# Patient Record
Sex: MALE | Race: WHITE | NOT HISPANIC OR LATINO | Employment: OTHER | URBAN - METROPOLITAN AREA
[De-identification: names, ages, dates, MRNs, and addresses within clinical notes are randomized per-mention and may not be internally consistent; named-entity substitution may affect disease eponyms.]

---

## 2022-04-06 ENCOUNTER — APPOINTMENT (OUTPATIENT)
Dept: GENERAL RADIOLOGY | Facility: HOSPITAL | Age: 47
End: 2022-04-06

## 2022-04-06 ENCOUNTER — HOSPITAL ENCOUNTER (EMERGENCY)
Facility: HOSPITAL | Age: 47
Discharge: HOME | End: 2022-04-06
Attending: EMERGENCY MEDICINE | Admitting: EMERGENCY MEDICINE

## 2022-04-06 VITALS
DIASTOLIC BLOOD PRESSURE: 83 MMHG | RESPIRATION RATE: 20 BRPM | SYSTOLIC BLOOD PRESSURE: 153 MMHG | WEIGHT: 315 LBS | HEART RATE: 100 BPM | HEIGHT: 73 IN | OXYGEN SATURATION: 94 % | BODY MASS INDEX: 41.75 KG/M2 | TEMPERATURE: 98.1 F

## 2022-04-06 DIAGNOSIS — S69.92XA INJURY OF LEFT WRIST, INITIAL ENCOUNTER: ICD-10-CM

## 2022-04-06 DIAGNOSIS — S39.012A STRAIN OF LUMBAR REGION, INITIAL ENCOUNTER: ICD-10-CM
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
## 2022-04-06 PROCEDURE — 73110 X-RAY EXAM OF WRIST: CPT

## 2022-04-06 PROCEDURE — 72100 X-RAY EXAM L-S SPINE 2/3 VWS: CPT

## 2022-04-06 PROCEDURE — 99283 EMERGENCY DEPT VISIT LOW MDM: CPT

## 2022-04-06 NOTE — ED PROVIDER NOTES
Subjective   Pt states he slipped on wet floor in [employer] today. Did not land on ground but caught himself on wall with left wrist and complains of back pain.       History provided by:  Patient  Fall  Mechanism of injury: fall    Injury location: left wrist and back.  Time since incident:  2 hours  Arrived directly from scene: yes    Associated symptoms: back pain        Review of Systems   Constitutional: Negative for appetite change, chills, fatigue and fever.   HENT: Negative.    Eyes: Negative.  Negative for photophobia.   Respiratory: Negative.    Gastrointestinal: Negative.    Endocrine: Negative.    Genitourinary: Negative.    Musculoskeletal: Positive for back pain.   Skin: Negative.    Allergic/Immunologic: Negative.  Negative for immunocompromised state.   Neurological: Negative.    Hematological: Negative.    Psychiatric/Behavioral: Negative.    All other systems reviewed and are negative.      History reviewed. No pertinent past medical history.    Allergies   Allergen Reactions   • Tramadol Anaphylaxis   • Penicillins Angioedema       History reviewed. No pertinent surgical history.    History reviewed. No pertinent family history.    Social History     Socioeconomic History   • Marital status:    Tobacco Use   • Smoking status: Current Every Day Smoker     Packs/day: 1.00     Years: 30.00     Pack years: 30.00     Types: Cigarettes   Substance and Sexual Activity   • Drug use: Defer   • Sexual activity: Defer           Objective   Physical Exam  Vitals and nursing note reviewed.   Constitutional:       General: He is not in acute distress.     Appearance: Normal appearance. He is not ill-appearing or toxic-appearing.   HENT:      Head: Normocephalic.   Cardiovascular:      Rate and Rhythm: Normal rate and regular rhythm.      Heart sounds: Normal heart sounds.   Pulmonary:      Effort: Pulmonary effort is normal.      Breath sounds: Normal breath sounds.   Musculoskeletal:        Arms:        Lumbar back: Tenderness present. Decreased range of motion.      Comments: Pain along that area  No saddle anesthesia    Neurological:      Mental Status: He is alert.           MDM  Number of Diagnoses or Management Options  Fall on same level from slipping, tripping or stumbling, initial encounter  Injury of left wrist, initial encounter  Strain of lumbar region, initial encounter  Diagnosis management comments: Pt is a 46 y.o. male that presents today with Patient presents with:  Fall       Work up today:  Lab Results (last 24 hours)     ** No results found for the last 24 hours. **      XR Spine Lumbar 2 or 3 View    Result Date: 4/6/2022  PROCEDURE: XR SPINE LUMBAR 2 OR 3 VW  COMPARISON: None  INDICATIONS: slipped and fell, lower back pain  FINDINGS:   There is an incidental transitional element at the lumbosacral junction.  No evidence of fracture or subluxation.  Mild disc degeneration and facet OA are present throughout the lumbar spine.  There are no lytic or sclerotic lesions.  IMPRESSION: Mild degenerative change.  No acute osseous abnormalities are identified.   BARB BAEZ MD       Electronically Signed and Approved By: BARB BAEZ MD on 4/06/2022 at 15:47             XR Wrist 3+ View Left    Result Date: 4/6/2022  PROCEDURE: XR WRIST 3+ VW LEFT  COMPARISON: None  INDICATIONS: slipped and fell, low back pain  FINDINGS:  There is mild deformity of the 2nd metacarpal shaft, likely related to an old, healed fracture.  No acute fracture or malalignment is seen.  Soft tissues appear within normal limits.        1. Suspected old, healed fracture of the 2nd metacarpal shaft 2. No acute fracture or malalignment.      Gaudencio Duran M.D.       Electronically Signed and Approved By: Gaudencio Duran M.D. on 4/06/2022 at 15:58              @No orders to display       The patient will pursue further outpatient evaluation with the primary care physician or other designated or consulting physician as outlined  in the discharge instructions. They are agreeable to this plan of care and follow-up instructions have been explained in detail. The patient has received these instructions in written format and have expressed an understanding of the discharge instructions. The patient is aware that any significant change in condition or worsening of symptoms should prompt an immediate return to this or the closest emergency department or call to 911.  Pt is otherwise non toxic and non ill appearing and stable for d/c home.  Pt is in agreement with this.  All questions answered at bedside.          Amount and/or Complexity of Data Reviewed  Tests in the radiology section of CPT®: reviewed  Independent visualization of images, tracings, or specimens: yes    Risk of Complications, Morbidity, and/or Mortality  Presenting problems: low  Diagnostic procedures: low  Management options: low    Patient Progress  Patient progress: stable      Final diagnoses:   Fall on same level from slipping, tripping or stumbling, initial encounter   Strain of lumbar region, initial encounter   Injury of left wrist, initial encounter       ED Disposition  ED Disposition     ED Disposition   Discharge    Condition   Stable    Comment   --             ortho in your hometown               Medication List      No changes were made to your prescriptions during this visit.          Arnulfo Coronado PA  04/06/22 [id number]